# Patient Record
Sex: FEMALE | Race: BLACK OR AFRICAN AMERICAN | Employment: FULL TIME | URBAN - METROPOLITAN AREA
[De-identification: names, ages, dates, MRNs, and addresses within clinical notes are randomized per-mention and may not be internally consistent; named-entity substitution may affect disease eponyms.]

---

## 2017-06-29 ENCOUNTER — TELEPHONE (OUTPATIENT)
Dept: SURGERY | Age: 52
End: 2017-06-29

## 2018-12-14 ENCOUNTER — HOSPITAL ENCOUNTER (OUTPATIENT)
Dept: VASCULAR SURGERY | Age: 53
Discharge: HOME | End: 2018-12-14
Attending: FAMILY MEDICINE
Payer: COMMERCIAL

## 2018-12-14 DIAGNOSIS — M71.22 SYNOVIAL CYST OF LEFT POPLITEAL SPACE: ICD-10-CM

## 2018-12-14 PROCEDURE — 93971 EXTREMITY STUDY: CPT

## 2018-12-14 NOTE — PROCEDURES
[facility]  *** FINAL REPORT ***    Name: Melisa Noguera  MRN: [id number]    Outpatient  : 1965  HIS Order #: [id number]  [address] [facility] Visit #: [id number]  Date: 14 Dec 2018    TYPE OF TEST: Peripheral Venous Testing    REASON FOR TEST  Limb swelling    Left Leg:-  Deep venous thrombosis:           No  Superficial venous thrombosis:    No  Deep venous insufficiency:        No  Superficial venous insufficiency: No      INTERPRETATION/FINDINGS  PROCEDURE:  LEFT LOWER EXTREMITY VENOUS DUPLEX . Evaluation of lower  extremity veins with ultrasound (B-mode imaging, pulsed Doppler, color   Doppler). Includes the common femoral, deep femoral, femoral,  popliteal, posterior tibial, peroneal, and great saphenous veins. Other veins, for example the gastrocnemius and soleal veins, may also  be visualized. FINDINGS: Kerry Sara scale and color flow duplex images of the veins in the  left lower extremity demonstrate normal compressibility, spontaneous  and augmented flow profiles, and absence of filling defects throughout   the deep and superficial veins in the left lower extremity. CONCLUSION:  Left lower extremity venous duplex negative for deep  venous thrombosis or thrombophlebitis. Right common femoral vein is  thrombus free. ADDITIONAL COMMENTS  NOTE: No Baker's cyst noted in left  in left popliteal fossa. I have personally reviewed the data relevant to the interpretation of  this  study. TECHNOLOGIST: Dagoberto Dance, RVT  Signed: 2018 08:44 AM    PHYSICIAN: Ryan Argueta MD  Signed: 2018 09:48 AM

## 2020-10-27 ENCOUNTER — OFFICE VISIT (OUTPATIENT)
Dept: SURGERY | Age: 55
End: 2020-10-27
Payer: COMMERCIAL

## 2020-10-27 VITALS
HEART RATE: 80 BPM | BODY MASS INDEX: 24.91 KG/M2 | HEIGHT: 66 IN | OXYGEN SATURATION: 100 % | SYSTOLIC BLOOD PRESSURE: 129 MMHG | DIASTOLIC BLOOD PRESSURE: 86 MMHG | RESPIRATION RATE: 20 BRPM | TEMPERATURE: 98.5 F | WEIGHT: 155 LBS

## 2020-10-27 DIAGNOSIS — R63.5 WEIGHT GAIN, ABNORMAL: Primary | ICD-10-CM

## 2020-10-27 DIAGNOSIS — Z98.84 GASTRIC BANDING STATUS: ICD-10-CM

## 2020-10-27 PROCEDURE — 99203 OFFICE O/P NEW LOW 30 MIN: CPT | Performed by: SURGERY

## 2020-10-27 RX ORDER — ESTRADIOL 0.5 MG/1
TABLET ORAL DAILY
COMMUNITY

## 2020-10-27 RX ORDER — TOPIRAMATE 50 MG/1
TABLET, FILM COATED ORAL 2 TIMES DAILY
COMMUNITY

## 2020-10-27 RX ORDER — PROGESTERONE 100 MG/1
CAPSULE ORAL
COMMUNITY

## 2020-10-27 NOTE — PROGRESS NOTES
1. Have you been to the ER, urgent care clinic since your last visit? Hospitalized since your last visit? No    2. Have you seen or consulted any other health care providers outside of the [address] [place] [address] since your last visit? Include any pap smears or colon screening.  No

## 2020-10-28 ENCOUNTER — TRANSCRIBE ORDER (OUTPATIENT)
Dept: REGISTRATION | Age: 55
End: 2020-10-28

## 2020-10-28 DIAGNOSIS — Z01.812 PRE-PROCEDURE LAB EXAM: Primary | ICD-10-CM

## 2020-10-28 NOTE — PROGRESS NOTES
Surgery Consult    Subjective:      Nicole Thompson is a 54 y.o. female with a history of morbid obesity, managed through laparoscopic adjustable gastric banding in 2010 with Dr. Armando Diop. She has done well from weight weight loss standpoint, with decrease in body mass index to 24 with good compliance with diet/exercise and adjustments as needed. Last adjustment in 2015. Over the last several months, she notes gradual weight regain with decrease in sense of restriction. She has increased exercise and is compliant with a bariatric diet with no improvement in return to baseline weight. She denies dysphagia, regurgitation, reflux symptoms, nausea, vomiting, abdominal pain. She previously underwent resiting of her band port with improvement in port related pain.     Patient Active Problem List    Diagnosis Date Noted    Weight gain, abnormal 83/81/8951    Complication of gastric band procedure (port site pain) 08/20/2014    Nausea 07/31/2014    Gastric banding status 08/23/2012    HTN (hypertension) 08/23/2012    Migraine headache 08/23/2012    DJD (degenerative joint disease) 08/23/2012     Past Medical History:   Diagnosis Date    Acid reflux disease     GERD (gastroesophageal reflux disease)     Hypertension     Migraine syndrome     Morbid obesity ([address]     Osteoarthritis     knees    Sleep apnea     Thromboembolus ([address] 2009    bilateral PE    Unspecified sleep apnea     no longer has since gastric band and wt loss/but not a new sleep study      Past Surgical History:   Procedure Laterality Date    ABDOMEN SURGERY PROC UNLISTED  3/3/10    Lap adjustable band (REALIZE)    HX DILATION AND CURETTAGE       x4 for miscarriages    HX GI  3/3/10    Lap adjustable gastric band (REALIZE)    HX GI  2010    colonoscopy x2    HX GI  8/28/14     Removal and replacement of subcutaneous port componet by Dr Myriam Cedeno HX HEENT      wisdom teeth removed    HX HERNIA REPAIR  3/3/10    hiatal hernia repair      Social History     Tobacco Use    Smoking status: Never Smoker    Smokeless tobacco: Never Used   Substance Use Topics    Alcohol use: Yes     Comment: occasional      Family History   Problem Relation Age of Onset   Uma Natarajanoth Stroke Mother     Heart Attack Mother     Hypertension Mother     Diabetes Father     Heart Disease Father     Hypertension Father     Diabetes Sister     Heart Disease Sister     Hypertension Sister     Lung Disease Sister     COPD Sister     Hypertension Sister     Hypertension Brother     Anesth Problems Neg Hx       Current Outpatient Medications   Medication Sig    estradioL (Estrace) 0.5 mg tablet Take  by mouth daily.  topiramate (TOPAMAX) 50 mg tablet Take  by mouth two (2) times a day.  progesterone (PROMETRIUM) 100 mg capsule progesterone micronized 100 mg capsule   Take 1 capsule every day by oral route.  rivaroxaban (XARELTO) 10 mg tablet Take 20 mg by mouth daily.  ergocalciferol (VITAMIN D2) 50,000 unit capsule Take 100,000 Units by mouth two (2) times a week.  hydrocortisone (HYCORT) 1 % ointment Apply  to affected area two (2) times a day. use thin layer    furosemide (LASIX) 20 mg tablet Take 20 mg by mouth daily.  triamterene-hydrochlorothiazide (DYAZIDE) 37.5-25 mg per capsule Take 1 Cap by mouth daily.  propranolol (INDERAL) 80 mg tablet Take 40 mg by mouth two (2) times a day.  frovatriptan (FROVA) 2.5 mg tablet Take 2.5 mg by mouth once as needed.  cyclobenzaprine (FLEXERIL) 5 mg tablet Take 5 mg by mouth three (3) times daily as needed.  metaxalone (SKELAXIN) 800 mg tablet Take 800 mg by mouth as needed.  traZODone (DESYREL) 50 mg tablet Take 50 mg by mouth as needed.  mometasone (NASONEX) 50 mcg/actuation nasal spray 2 Sprays by Both Nostrils route as needed. No current facility-administered medications for this visit. Allergies   Allergen Reactions    Latex Hives and Swelling     Blisters. Patient stated it occurred after her surgery 9/2014.  Adhesive Tape-Silicones Other (comments)     swels area and causes bruising       Review of Systems:    A complete review of systems was negative except as noted in the HPI. Objective:        Visit Vitals  /86   Pulse 80   Temp 98.5 °F (36.9 °C)   Resp 20   Ht 5' 6\" (1.676 m)   Wt 155 lb (70.3 kg)   LMP 09/11/2012   SpO2 100%   BMI 25.02 kg/m²       Physical Exam:  GENERAL: alert, cooperative, no distress, appears stated age, EYE: negative, LYMPH NODES: No supraclavicular or cervical adenopathy. THROAT & NECK: normal, LUNG: clear to auscultation bilaterally, HEART: regular rate and rhythm, S1, S2 normal, no murmur. ABDOMEN: Nondistended, soft, normal active bowel sounds. Upper abdominal port without pain with palpation or cellulitis of overlying skin. No hernia. EXTREMITIES:  extremities normal, atraumatic, no cyanosis or edema, SKIN: Normal., NEUROLOGIC: negative    Assessment:     Status post laparoscopic adjustable gastric banding with recent weight regain; no improvement with increase exercise regimen, adherence to bariatric diet. She also notes decreased sense of restriction but denies dysphagia or reflux symptoms. Plan:     1. I recommend proceeding with scheduling for fluoroscopy guided band adjustment. 2.  We will schedule dietitian appointment to review food diary. 3.  Continue exercise regimen.

## 2020-10-28 NOTE — PATIENT INSTRUCTIONS
Abnormal Weight Gain: Care Instructions Your Care Instructions There are two types of weight gainnormal and abnormal. Normal weight gain is usually caused by eating too much or exercising too little. It can also happen as you get older. But abnormal weight gain has other causes. It can be caused by a problem with your thyroid gland, called hypothyroidism. Or it can be caused by a problem with your adrenal glands, called Cushing's syndrome. Or your body could be holding too much fluid because of kidney, liver, or heart problems. In some cases, a medicine you take can cause you to gain weight. You can work with your doctor to find out the cause of your weight gain. You will probably need tests to do this. Follow-up care is a key part of your treatment and safety. Be sure to make and go to all appointments, and call your doctor if you are having problems. It's also a good idea to know your test results and keep a list of the medicines you take. How can you care for yourself at home? · Weigh yourself at the same time every day. It's best to do it first thing in the morning after you empty your bladder. Be sure to always wear the same amount of clothing. · Write down any changes in your weight and the possible causes. Discuss these with your doctor. · Your doctor may want you to change your diet and exercise habits. A good way to lose weight is to reduce calories and increase exercise. · Walking is an easy way to get exercise. Try to walk a little longer every day. You also may want to swim, bike, or do other activities. · Ask your doctor if you should see a dietitian. This is a person who can help you plan meals that work best for your lifestyle. · If your doctor prescribed medicines, take them exactly as prescribed. Call your doctor if you think you are having a problem with your medicine. You will get more details on the specific medicines your doctor prescribes. When should you call for help? Watch closely for changes in your health, and be sure to contact your doctor if: 
  · You do not get better as expected.  
  · You continue to gain weight. Where can you learn more? Go to http://www.[employer].com/ Enter A175 in the search box to learn more about \"Abnormal Weight Gain: Care Instructions. \" Current as of: December 11, 2019               Content Version: 12.6 © 1444-0630 [employer]. Care instructions adapted under license by [employer] (which disclaims liability or warranty for this information). If you have questions about a medical condition or this instruction, always ask your healthcare professional. [address] any warranty or liability for your use of this information.

## 2020-10-31 ENCOUNTER — HOSPITAL ENCOUNTER (OUTPATIENT)
Dept: PREADMISSION TESTING | Age: 55
Discharge: HOME | End: 2020-10-31
Payer: COMMERCIAL

## 2020-10-31 DIAGNOSIS — Z01.812 PRE-PROCEDURE LAB EXAM: ICD-10-CM

## 2020-10-31 PROCEDURE — 87635 SARS-COV-2 COVID-19 AMP PRB: CPT

## 2020-11-01 LAB — SARS-COV-2, COV2NT: NOT DETECTED

## 2020-11-04 ENCOUNTER — APPOINTMENT (OUTPATIENT)
Dept: GENERAL RADIOLOGY | Age: 55
End: 2020-11-04
Attending: SURGERY
Payer: COMMERCIAL

## 2020-11-04 ENCOUNTER — HOSPITAL ENCOUNTER (OUTPATIENT)
Age: 55
Setting detail: OUTPATIENT SURGERY
Discharge: HOME | End: 2020-11-04
Attending: SURGERY | Admitting: SURGERY
Payer: COMMERCIAL

## 2020-11-04 VITALS
HEIGHT: 66 IN | DIASTOLIC BLOOD PRESSURE: 80 MMHG | BODY MASS INDEX: 25.13 KG/M2 | WEIGHT: 156.38 LBS | SYSTOLIC BLOOD PRESSURE: 130 MMHG

## 2020-11-04 PROCEDURE — 43999 UNLISTED PROCEDURE STOMACH: CPT | Performed by: SURGERY

## 2020-11-04 PROCEDURE — S2083 ADJUSTMENT GASTRIC BAND: HCPCS | Performed by: PHYSICIAN ASSISTANT

## 2020-11-04 PROCEDURE — 77002 NEEDLE LOCALIZATION BY XRAY: CPT

## 2020-11-04 PROCEDURE — 92611 MOTION FLUOROSCOPY/SWALLOW: CPT | Performed by: SURGERY

## 2020-11-04 NOTE — DISCHARGE INSTRUCTIONS
Post Lap Band Fill Instructions    Your band is now very tight. Some swelling can occur at the band following a fill. Therefore, you should eat:    Full liquids for 2 days (today & tomorrow)  Soft & mushy foods for 2 days  Resume regular food on the 5th day following your fill. - Thursday  **All meals should fit in a 4 ounce cup.  (1/2 cup container)**    Eating tips:  Put down your fork between bites. Do not talk and eat at the same time. If you must use your fork, use it to push the food around on your plate while chewing what is in your mouth. No drinking 30 minutes prior or one hour following a meal.    Weigh yourself every Monday morning. Call for a fill if you are losing less than 1.5 pounds a week. Call for a fill if you are having increasing hunger between meals. Call for an evaluation if you develop new reflux or inability to tolerate solid foods. Your next regular follow-up appointment is in:  4 to 6 weeks  Please call the office at [phone] to schedule this appointment. If you have any problems before your scheduled appointment, don't wait. Call the office when you are having the problem. They may need to see your before your scheduled appointment.

## 2020-11-04 NOTE — PROGRESS NOTES
PROCEDURAL PROGRESS NOTES FOR LAP BAND FILLS      Patient ID:   Name: Ford Moran   Medical Record Number: [id number]   YOB: 1965         Date of Procedure: 11/4/2020    Pre-Procedure Diagnosis: MORBID OBESITY,WEIGHT GAIN    Post-Procedure Diagnosis:  MORBID OBESITY,WEIGHT GAIN              Height: 5' 6\" (167.6 cm)      Weight: 70.9 kg (156 lb 6 oz)                  Surgeon(s) and Role:  SUKH Fragoso--performing band evaluation/ adjustment with use of fluoroscopy        * Magalys Preciado MD - supervising MD     Procedure: Procedure(s):  ADJUSTMENT\ Evaluation  OF BAND UNDER FLUORO    Findings:   Band in what position:  Good                     . Estimated Blood Loss: 0 ml    Complications: None   Complications:  None       Pt presents today for band adjustment under fluoroscopy . Informed consent was obtained  Last adjustment: pt had a Realize band placed by  10 years ago. No follow up inpast 5 years since she saw Aaron Kyle in 2015 and was adjusted to a total of 9.5 cc in her band,  She saw Dr. Evelia Small last month and c/o wt gain and hunger and was scheduled for band evaluation for today     Patient  Has not  been experiencing nausea, vomiting, dysphagia, and/or reflux. 9.5 cc in band documented   0 cc added to band today    Pt ingested barium   Barium flowed through the band /without evidence of leak or obstruction. There was not  any pouch dilatation or esophageal dysmotility. The band appeared to be in good position. pt s band is in appropriate adjustment with slow transit but no obstruction, given BMI 25 and 9.5 cc in her band pt does not meet criteria for band adjustment,- no adjustment indicated today and pt was agreeable to that     Diet reviewed with pt and counseled to discontinue liquid meals and replace with textured protein foods if she has hunger during the day. Plan:   Patient was instructed to resume her usual  diet as tolerated.  She can follow up with nutrition for diet review   Patient to notify the office if experiences frequent nausea, vomiting, reflux, and/or dysphagia. Patient to follow-up in office in 4-6 weeks.  Patient was discharged in stable and satisfactory condition  [facility]      Signed By: SUKH Blackmon     November 4, 2020 12:52 PM

## 2021-08-12 ENCOUNTER — TELEPHONE (OUTPATIENT)
Dept: SURGERY | Age: 56
End: 2021-08-12

## 2021-08-12 NOTE — TELEPHONE ENCOUNTER
Patient called in wanting a letter for clearance to have laser liposuction treatment. She also wants to know Dr. Kirsty Clark with her band. She wants to know if his plan is for her to keep it or have it removed.    CB: [phone]

## 2021-10-05 ENCOUNTER — OFFICE VISIT (OUTPATIENT)
Dept: SURGERY | Age: 56
End: 2021-10-05
Payer: COMMERCIAL

## 2021-10-05 VITALS
RESPIRATION RATE: 18 BRPM | BODY MASS INDEX: 26.55 KG/M2 | WEIGHT: 165.2 LBS | HEART RATE: 86 BPM | DIASTOLIC BLOOD PRESSURE: 82 MMHG | HEIGHT: 66 IN | SYSTOLIC BLOOD PRESSURE: 130 MMHG | OXYGEN SATURATION: 100 % | TEMPERATURE: 98 F

## 2021-10-05 DIAGNOSIS — R63.5 WEIGHT GAIN, ABNORMAL: Primary | ICD-10-CM

## 2021-10-05 PROCEDURE — 99213 OFFICE O/P EST LOW 20 MIN: CPT | Performed by: SURGERY

## 2021-10-05 NOTE — PROGRESS NOTES
1. Have you been to the ER, urgent care clinic since your last visit? Hospitalized since your last visit? no    2. Have you seen or consulted any other health care providers outside of the [address] [place] [address] since your last visit? Include any pap smears or colon screening.  no

## 2021-10-05 NOTE — LETTER
11/30/2021 11:07 AM    Patient:  Robin Carrasco   YOB: 1965  Date of Visit: 10/5/2021    To whom it may concern:    Laure Goodman underwent laparoscopic adjustable gastric banding (realize band) in 2010 with satisfactory weight loss results; she underwent port reciting in 2014 which improved port related abdominal wall pain. She has brought it to our attention she is interested in a form of liposuction. As her band port and tubing are in the subcutaneous layer of the abdominal wall, the effect of liposuction on these portions of the device is unknown. Therefore, I would recommend caution in the use of such devices in proximity to her upper abdominal port if the decision is made to proceed with liposuction. Please feel free to contact me if additional information would be helpful.     Lavonne Frankel, MD, FACS

## 2021-11-08 ENCOUNTER — TELEPHONE (OUTPATIENT)
Dept: SURGERY | Age: 56
End: 2021-11-08

## 2021-11-08 NOTE — TELEPHONE ENCOUNTER
I called the patient and she said she came in and saw Dr Juan Carranza on 10/5/21 and he was going to give her a letter stating she can have laser liposuction. Pt is asking for the letter. I told her it has not been done yet but I will forward this to Dr Juan Carranza so he can provide her with a letter. Pt in agreement.

## 2021-11-08 NOTE — TELEPHONE ENCOUNTER
I called the patient and I received her voice mail, Mail box is full so unable to leave a message. We are unable to clear her for Liposuction if it is abdominal as she has a lap gastric band and port. We can provide her with a letter just stating she has a lap gastric band. Will wait for patient to call us back.

## 2021-11-08 NOTE — TELEPHONE ENCOUNTER
Patient called stating that she came in for her follow up visit to get the a letter for clearance (to have laser liposuction treatment) but did not receive it. Patient stated she was told the letter would be mailed to her. She would to come in office to pick this note up asap as this has been on going for 3 months. Please advise.

## 2021-11-12 NOTE — TELEPHONE ENCOUNTER
Patient called in wanting to know the status of her letter. Patient states it has been a month.    CB: [phone]

## 2021-11-30 ENCOUNTER — DOCUMENTATION ONLY (OUTPATIENT)
Dept: SURGERY | Age: 56
End: 2021-11-30

## 2021-11-30 NOTE — PROGRESS NOTES
Subjective:      Ophelia Heimlich is a 64 y.o. female presents for reevaluation of weight gain following laparoscopic possible gastric banding approximately 11 years ago with Dr. Tila Larson. Since last visit in 2020, she underwent fluoroscopy, possible band adjustment, but no fluid was added as patient's BMI was 25, with slow transit of barium through the pouch and band channel. She did not make appointment with dietitian as ordered. She notes some weight gain since last year. She is interested in liposuction of abdominal wall and requests a letter clearing her for this procedure. She denies nausea, vomiting, regurgitation, reflux symptoms, or dysphagia. No port pain. Objective:     Visit Vitals  /82   Pulse 86   Temp 98 °F (36.7 °C) (Oral)   Resp 18   Ht 5' 6\" (1.676 m)   Wt 165 lb 3.2 oz (74.9 kg)   LMP 09/11/2012   SpO2 100%   BMI 26.66 kg/m²       General:  alert, cooperative, no distress, appears stated age, mildly obese   Abdomen:  Deferred   Incision:   Deferred     Assessment:     Weight regain following laparoscopic adjustable gastric banding; no dietitian follow-up. Weight gain is marginal, no reflux symptoms. I counseled her that liposuction could alter her port or tubing within the abdominal wall or damage it. I will generate a letter to this effect. She will have to decide with her plastic surgeon whether to proceed. Plan:     1. Continue current medications. 2.  Bariatric diet as tolerated. Exercise as tolerated. 3.  Follow-up in 6 months sooner if clinical issues arise.

## 2021-11-30 NOTE — PATIENT INSTRUCTIONS
Abnormal Weight Gain: Care Instructions  Your Care Instructions     There are two types of weight gainnormal and abnormal. Normal weight gain is usually caused by eating too much or exercising too little. It can also happen as you get older. But abnormal weight gain has other causes. It can be caused by a problem with your thyroid gland, called hypothyroidism. Or it can be caused by a problem with your adrenal glands, called Cushing's syndrome. Or your body could be holding too much fluid because of kidney, liver, or heart problems. In some cases, a medicine you take can cause you to gain weight. You can work with your doctor to find out the cause of your weight gain. You will probably need tests to do this. Follow-up care is a key part of your treatment and safety. Be sure to make and go to all appointments, and call your doctor if you are having problems. It's also a good idea to know your test results and keep a list of the medicines you take. How can you care for yourself at home? · Weigh yourself at the same time every day. It's best to do it first thing in the morning after you empty your bladder. Be sure to always wear the same amount of clothing. · Write down any changes in your weight and the possible causes. Discuss these with your doctor. · Your doctor may want you to change your diet and exercise habits. A good way to lose weight is to reduce calories and increase exercise. · Walking is an easy way to get exercise. Try to walk a little longer every day. You also may want to swim, bike, or do other activities. · Ask your doctor if you should see a dietitian. This is a person who can help you plan meals that work best for your lifestyle. · If your doctor prescribed medicines, take them exactly as prescribed. Call your doctor if you think you are having a problem with your medicine. You will get more details on the specific medicines your doctor prescribes.   When should you call for help?  Watch closely for changes in your health, and be sure to contact your doctor if:    · You do not get better as expected.     · You continue to gain weight. Where can you learn more? Go to http://www.[employer].com/  Enter A175 in the search box to learn more about \"Abnormal Weight Gain: Care Instructions. \"  Current as of: March 17, 2021               Content Version: 13.0  © 6976-7667 [employer], [employer]. Care instructions adapted under license by [employer] (which disclaims liability or warranty for this information). If you have questions about a medical condition or this instruction, always ask your healthcare professional. [address] any warranty or liability for your use of this information.

## 2023-01-25 ENCOUNTER — TRANSCRIBE ORDER (OUTPATIENT)
Dept: SCHEDULING | Age: 58
End: 2023-01-25

## 2023-01-25 DIAGNOSIS — R22.41 LOCALIZED SWELLING, MASS AND LUMP, LOWER LIMB, RIGHT: Primary | ICD-10-CM

## 2023-01-28 ENCOUNTER — HOSPITAL ENCOUNTER (OUTPATIENT)
Dept: VASCULAR SURGERY | Age: 58
Discharge: HOME | End: 2023-01-28
Attending: FAMILY MEDICINE
Payer: COMMERCIAL

## 2023-01-28 DIAGNOSIS — R22.41 LOCALIZED SWELLING, MASS AND LUMP, LOWER LIMB, RIGHT: ICD-10-CM

## 2023-01-28 PROCEDURE — 93971 EXTREMITY STUDY: CPT

## 2023-02-03 ENCOUNTER — OFFICE VISIT (OUTPATIENT)
Dept: CARDIOLOGY CLINIC | Age: 58
End: 2023-02-03
Payer: COMMERCIAL

## 2023-02-03 ENCOUNTER — TELEPHONE (OUTPATIENT)
Dept: CARDIOLOGY CLINIC | Age: 58
End: 2023-02-03

## 2023-02-03 VITALS
RESPIRATION RATE: 18 BRPM | HEART RATE: 65 BPM | DIASTOLIC BLOOD PRESSURE: 84 MMHG | SYSTOLIC BLOOD PRESSURE: 134 MMHG | WEIGHT: 189 LBS | BODY MASS INDEX: 30.37 KG/M2 | OXYGEN SATURATION: 100 % | HEIGHT: 66 IN

## 2023-02-03 DIAGNOSIS — R07.9 CHEST PAIN, UNSPECIFIED TYPE: ICD-10-CM

## 2023-02-03 DIAGNOSIS — R94.31 ABNORMAL EKG: ICD-10-CM

## 2023-02-03 DIAGNOSIS — I10 PRIMARY HYPERTENSION: Primary | ICD-10-CM

## 2023-02-03 RX ORDER — CHLORHEXIDINE GLUCONATE 1.2 MG/ML
RINSE ORAL
COMMUNITY

## 2023-02-03 RX ORDER — RIZATRIPTAN BENZOATE 10 MG/1
TABLET, ORALLY DISINTEGRATING ORAL
COMMUNITY

## 2023-02-03 RX ORDER — TERBINAFINE HYDROCHLORIDE 250 MG/1
TABLET ORAL
COMMUNITY

## 2023-02-03 RX ORDER — ESTRADIOL/NORETHINDRONE ACETATE TRANSDERMAL SYSTEM .05; .14 MG/D; MG/D
PATCH, EXTENDED RELEASE TRANSDERMAL
COMMUNITY
Start: 2022-11-28

## 2023-02-03 RX ORDER — SUVOREXANT 10 MG/1
TABLET, FILM COATED ORAL
COMMUNITY
Start: 2022-11-03

## 2023-02-03 RX ORDER — IMIPRAMINE HYDROCHLORIDE 25 MG/1
TABLET, FILM COATED ORAL
COMMUNITY
Start: 2023-01-18

## 2023-02-03 RX ORDER — AMOXICILLIN AND CLAVULANATE POTASSIUM 875; 125 MG/1; MG/1
TABLET, FILM COATED ORAL
COMMUNITY
End: 2023-02-03

## 2023-02-03 RX ORDER — OXYCODONE AND ACETAMINOPHEN 5; 325 MG/1; MG/1
TABLET ORAL
COMMUNITY

## 2023-02-03 NOTE — PROGRESS NOTES
Hudson Ritchie MD, MS, [address] [facility]            HISTORY OF PRESENT ILLNESS:    Shorty Solorio is a 62 y.o. female referred for chest pain. Per Doc. Rachelf - typical chest pain with history of Pulmonary embolus and mild global hypokinesis on stress nuclear scan 12/22/22. BP controlled today. Gets episodes - chest pressure, on and off - unprovoked - paralyzes her in the position she is int. Sharp pain. Releases itself. Scary, painful. Does not know how it's happening. Left sided. Not everyday, maybe not every week. Wasn't paying too much attention to it. Not recently. Can be pressure. Embarrassing, has been in the middle of presentations, has to excuse herself., has to pull over when driving. She cannot move. Palpitations on and off. No visual changes. ++ FH - mother stroke. Sister stroke, blood clots. Both has CAD< PCI. Niece 27, CAD stents in her arteries. NO tobacco.      EKG reviewed -Unusual p wave origin - short [place] interval.      Reviewed [facility] records - baseline EKG there also abnormal - unusual p wave axis/origin. Short [place] interval.    One day exercise nuc stress test -  exercised 5 min  BPM 88% MPHR 7 METS. No chest pain - small apical defect low risk. No ischemia noted    Unusual presentation - ? Musculoskeletal?     Her ectopic atrial rhythm is interesting - discussed 30 day event monitor to assess for atrial tachy or any arrhythmia associated with her abnormal EKG. She does have a significant FH CAD - will get echo + CCS in addition to 30 day event monitor and go from there. Repeat EKG when she comes back.               Total time spent >35 min, reviewing my prior notes, referring physician notes, other subspecialty and primary care notes, all available lab values, all available cardiac testing, all available radiology imaging, vital signs, weights, medications, potential medication interactions, family history, preparing my notes, updating diagnoses, correcting chart errors from other providers, documenting the above, discussing findings/results/testing methodologies/plan with patient, ordering tests/lab, sending prescriptions. SUMMARY:   Problem List  Date Reviewed: 2/3/2023            Codes Class Noted    Chest pain ICD-10-CM: R07.9  ICD-9-CM: 786.50  2/3/2023        Abnormal EKG ICD-10-CM: R94.31  ICD-9-CM: 794.31  2/3/2023        Weight gain, abnormal ICD-10-CM: R63.5  ICD-9-CM: 783.1  1/04/2563        Complication of gastric band procedure (port site pain) ICD-10-CM: K95.09  ICD-9-CM: 539.09  8/20/2014        Nausea ICD-10-CM: R11.0  ICD-9-CM: 787.02  7/31/2014        Gastric banding status ICD-10-CM: Z98.84  ICD-9-CM: V45.86  8/23/2012        HTN (hypertension) ICD-10-CM: I10  ICD-9-CM: 401.9  8/23/2012        Migraine headache ICD-10-CM: G43.909  ICD-9-CM: 346.90  8/23/2012        DJD (degenerative joint disease) ICD-10-CM: M19.90  ICD-9-CM: 715.90  8/23/2012           Current Outpatient Medications on File Prior to Visit   Medication Sig    estradiol-norethindrone (CombiPatch) 0.05-0.14 mg/24 hr CombiPatch 0.05 mg-0.14 mg/24 hr transdermal    imipramine (TOFRANIL) 25 mg tablet     oxyCODONE-acetaminophen (PERCOCET) 5-325 mg per tablet oxycodone-acetaminophen 5 mg-325 mg tablet    chlorhexidine (PERIDEX) 0.12 % solution chlorhexidine gluconate 0.12 % mouthwash    Belsomra 10 mg tablet TAKE 1 TABLET BY MOUTH EVERY DAY AT BEDTIME    rizatriptan (MAXALT-MLT) 10 mg disintegrating tablet TAKE 1 TABLET BY MOUTH FOR MIGRAINE AND MAY REPEAT DOSE WITHIN 2-24 HOURS FOR MAX 2 TABLETS IN 24 HOURS    terbinafine HCL (LAMISIL) 250 mg tablet terbinafine HCl 250 mg tablet    topiramate (TOPAMAX) 50 mg tablet Take  by mouth two (2) times a day. progesterone (PROMETRIUM) 100 mg capsule progesterone micronized 100 mg capsule   Take 1 capsule every day by oral route. hydrocortisone (HYCORT) 1 % ointment Apply  to affected area two (2) times a day.  use thin layer    furosemide (LASIX) 20 mg tablet Take 20 mg by mouth daily. rivaroxaban (XARELTO) 10 mg tablet Take 20 mg by mouth daily. propranolol (INDERAL) 80 mg tablet Take 40 mg by mouth two (2) times a day. ergocalciferol (ERGOCALCIFEROL) 1,250 mcg (50,000 unit) capsule Take 100,000 Units by mouth two (2) times a week. metaxalone (SKELAXIN) 800 mg tablet Take 800 mg by mouth as needed. traZODone (DESYREL) 50 mg tablet Take 50 mg by mouth as needed. mometasone (NASONEX) 50 mcg/actuation nasal spray 2 Sprays by Both Nostrils route as needed. No current facility-administered medications on file prior to visit. CARDIOLOGY STUDIES TO DATE:  No results found for this visit on 02/03/23.                 CARDIAC ROS:   positive for chest pain    Past Medical History:   Diagnosis Date    Acid reflux disease     GERD (gastroesophageal reflux disease)     Hypertension     Migraine syndrome     Morbid obesity ([facility] [address]     Osteoarthritis     knees    Sleep apnea     Thromboembolus ([facility] [place] [address] 2009    bilateral PE    Unspecified sleep apnea     no longer has since gastric band and wt loss/but not a new sleep study       Family History   Problem Relation Age of Onset    Stroke Mother     Heart Attack Mother     Hypertension Mother     Diabetes Father     Heart Disease Father     Hypertension Father     Diabetes Sister     Heart Disease Sister     Hypertension Sister     Lung Disease Sister     COPD Sister     Hypertension Sister     Hypertension Brother     Anesth Problems Neg Hx        Social History     Socioeconomic History    Marital status:      Spouse name: Not on file    Number of children: Not on file    Years of education: Not on file    Highest education level: Not on file   Occupational History    Not on file   Tobacco Use    Smoking status: Never    Smokeless tobacco: Never   Substance and Sexual Activity    Alcohol use: Yes     Comment: occasional    Drug use: No    Sexual activity: Not on file   Other Topics Concern    Not on file   Social History Narrative    Not on file     Social Determinants of Health     Financial Resource Strain: Not on file   Food Insecurity: Not on file   Transportation Needs: Not on file   Physical Activity: Not on file   Stress: Not on file   Social Connections: Not on file   Intimate Partner Violence: Not on file   Housing Stability: Not on file        GENERAL ROS:  A comprehensive review of systems was negative except for that written in the HPI.     Visit Vitals  /84 (BP 1 Location: Right upper arm, BP Patient Position: Sitting, BP Cuff Size: Large adult)   Pulse 65   Resp 18   Ht 5' 6\" (1.676 m)   Wt 85.7 kg (189 lb)   LMP 09/11/2012   SpO2 100%   BMI 30.51 kg/m²     Vitals:    02/03/23 1313   BP: 134/84   Pulse: 65   Resp: 18   SpO2: 100%   Weight: 85.7 kg (189 lb)   Height: 5' 6\" (1.676 m)        Wt Readings from Last 3 Encounters:   02/03/23 85.7 kg (189 lb)   10/05/21 74.9 kg (165 lb 3.2 oz)   11/04/20 70.9 kg (156 lb 6 oz)            BP Readings from Last 3 Encounters:   02/03/23 134/84   10/05/21 130/82   11/04/20 130/80       PHYSICAL EXAM  General appearance: alert, cooperative, no distress, appears stated age  Neck: supple, symmetrical, trachea midline, no adenopathy, thyroid: not enlarged, symmetric, no tenderness/mass/nodules, no carotid bruit, and no JVD  Lungs: clear to auscultation bilaterally  Heart: regular rate and rhythm, S1, S2 normal, no murmur, click, rub or gallop  Extremities: extremities normal, atraumatic, no cyanosis or edema    Lab Results   Component Value Date/Time    Cholesterol, total 180 05/12/2010 04:41 AM    HDL Cholesterol 30 05/12/2010 04:41 AM    LDL, calculated 136.4 (H) 05/12/2010 04:41 AM    Triglyceride 68 05/12/2010 04:41 AM    CHOL/HDL Ratio 6.0 (H) 05/12/2010 04:41 AM       Lab Results   Component Value Date/Time    WBC 7.9 05/12/2010 04:41 AM    Hemoglobin (POC) 14.6 05/11/2010 09:08 PM    HGB 12.8 05/12/2010 04:41 AM    Hematocrit (POC) 43 05/11/2010 09:08 PM    HCT 39.0 05/12/2010 04:41 AM    PLATELET 974 41/89/1164 04:41 AM    MCV 79.1 (L) 05/12/2010 04:41 AM        Lab Results   Component Value Date/Time    Cholesterol, total 180 05/12/2010 04:41 AM    HDL Cholesterol 30 05/12/2010 04:41 AM    LDL, calculated 136.4 (H) 05/12/2010 04:41 AM    Triglyceride 68 05/12/2010 04:41 AM    CHOL/HDL Ratio 6.0 (H) 05/12/2010 04:41 AM        ASSESSMENT  Diagnoses and all orders for this visit:    1. Primary hypertension  -     AMB POC EKG ROUTINE W/ 12 LEADS, INTER & REP  -     CT HEART W/O CONT WITH CALCIUM; Future    2. Chest pain, unspecified type  -     ECHO ADULT COMPLETE; Future  -     CT HEART W/O CONT WITH CALCIUM; Future    3. Abnormal EKG       Encounter Diagnoses   Name Primary? Primary hypertension Yes    Chest pain, unspecified type     Abnormal EKG      Orders Placed This Encounter    CT HEART W/O CONT WITH CALCIUM    AMB POC EKG ROUTINE W/ 12 LEADS, INTER & REP    estradiol-norethindrone (CombiPatch) 0.05-0.14 mg/24 hr    imipramine (TOFRANIL) 25 mg tablet    oxyCODONE-acetaminophen (PERCOCET) 5-325 mg per tablet    chlorhexidine (PERIDEX) 0.12 % solution    DISCONTD: amoxicillin-clavulanate (AUGMENTIN) 875-125 mg per tablet    Belsomra 10 mg tablet    rizatriptan (MAXALT-MLT) 10 mg disintegrating tablet    terbinafine HCL (LAMISIL) 250 mg tablet       Plan      Follow-up and Dispositions    Return in about 2 weeks (around 2/17/2023) for with echo same day. Nida Carranza MD  2/3/2023        [address]   [address]  [facility], [address] [place] [address] Avenue  [phone] (F)    [address]  [address]  [phone] (P)  [phone] (F)    ATTENTION:   This medical record was transcribed using an electronic medical records/speech recognition system. Although proofread, it may and can contain electronic, spelling and other errors.   Corrections may be executed at a later time. Please feel free to contact us for any clarifications as needed.

## 2023-02-03 NOTE — TELEPHONE ENCOUNTER
Enrolled with [employer] - Ordered and being shipped to patient's home address on file. ETA within 5-7 business days. event  Received:  Today  CORNELIO Clarke  30 day event monitor for palps per Dr. Gilberto Nascimento

## 2023-03-03 ENCOUNTER — HOSPITAL ENCOUNTER (OUTPATIENT)
Dept: CT IMAGING | Age: 58
End: 2023-03-03
Attending: INTERNAL MEDICINE
Payer: SELF-PAY

## 2023-03-03 DIAGNOSIS — I10 PRIMARY HYPERTENSION: ICD-10-CM

## 2023-03-03 DIAGNOSIS — R07.9 CHEST PAIN, UNSPECIFIED TYPE: ICD-10-CM

## 2023-03-03 PROCEDURE — 75571 CT HRT W/O DYE W/CA TEST: CPT

## 2023-03-13 ENCOUNTER — TELEPHONE (OUTPATIENT)
Dept: CARDIOLOGY CLINIC | Age: 58
End: 2023-03-13

## 2023-03-17 ENCOUNTER — TELEPHONE (OUTPATIENT)
Dept: CARDIOLOGY CLINIC | Age: 58
End: 2023-03-17

## 2023-03-17 NOTE — TELEPHONE ENCOUNTER
Spoke to patient after ID x2- coveyed Dr. Martin Clear result note below. MD Lauri Forde, BERNICEN  Good news, your coronary calcium score was ZERO, which is very low risk.

## 2023-06-26 ENCOUNTER — OFFICE VISIT (OUTPATIENT)
Age: 58
End: 2023-06-26
Payer: COMMERCIAL

## 2023-06-26 VITALS
OXYGEN SATURATION: 98 % | SYSTOLIC BLOOD PRESSURE: 120 MMHG | DIASTOLIC BLOOD PRESSURE: 70 MMHG | HEART RATE: 81 BPM | HEIGHT: 66 IN | BODY MASS INDEX: 29.09 KG/M2 | WEIGHT: 181 LBS

## 2023-06-26 DIAGNOSIS — R94.31 ABNORMAL EKG: ICD-10-CM

## 2023-06-26 DIAGNOSIS — I10 ESSENTIAL (PRIMARY) HYPERTENSION: Primary | ICD-10-CM

## 2023-06-26 DIAGNOSIS — I26.99 OTHER PULMONARY EMBOLISM WITHOUT ACUTE COR PULMONALE, UNSPECIFIED CHRONICITY (HCC): ICD-10-CM

## 2023-06-26 DIAGNOSIS — I42.9 CARDIOMYOPATHY (HCC): Primary | ICD-10-CM

## 2023-06-26 DIAGNOSIS — R94.31 ABNORMAL ELECTROCARDIOGRAM (ECG) (EKG): ICD-10-CM

## 2023-06-26 DIAGNOSIS — R07.9 CHEST PAIN, UNSPECIFIED TYPE: ICD-10-CM

## 2023-06-26 DIAGNOSIS — I10 ESSENTIAL (PRIMARY) HYPERTENSION: ICD-10-CM

## 2023-06-26 DIAGNOSIS — R07.9 CHEST PAIN: ICD-10-CM

## 2023-06-26 DIAGNOSIS — R07.9 CHEST PAIN, UNSPECIFIED: ICD-10-CM

## 2023-06-26 PROCEDURE — 3078F DIAST BP <80 MM HG: CPT | Performed by: INTERNAL MEDICINE

## 2023-06-26 PROCEDURE — 99214 OFFICE O/P EST MOD 30 MIN: CPT | Performed by: INTERNAL MEDICINE

## 2023-06-26 PROCEDURE — 93000 ELECTROCARDIOGRAM COMPLETE: CPT | Performed by: INTERNAL MEDICINE

## 2023-06-26 PROCEDURE — 3074F SYST BP LT 130 MM HG: CPT | Performed by: INTERNAL MEDICINE

## 2023-06-26 ASSESSMENT — ENCOUNTER SYMPTOMS
CHEST TIGHTNESS: 0
WHEEZING: 0
SHORTNESS OF BREATH: 0

## 2023-09-05 ENCOUNTER — HOSPITAL ENCOUNTER (OUTPATIENT)
Facility: HOSPITAL | Age: 58
Discharge: HOME | End: 2023-09-08
Attending: INTERNAL MEDICINE
Payer: COMMERCIAL

## 2023-09-05 VITALS — WEIGHT: 183 LBS | BODY MASS INDEX: 29.54 KG/M2

## 2023-09-05 DIAGNOSIS — R07.9 CHEST PAIN, UNSPECIFIED TYPE: ICD-10-CM

## 2023-09-05 DIAGNOSIS — I10 ESSENTIAL (PRIMARY) HYPERTENSION: ICD-10-CM

## 2023-09-05 DIAGNOSIS — R94.31 ABNORMAL EKG: ICD-10-CM

## 2023-09-05 DIAGNOSIS — R07.9 CHEST PAIN, UNSPECIFIED: ICD-10-CM

## 2023-09-05 DIAGNOSIS — R94.31 ABNORMAL ELECTROCARDIOGRAM (ECG) (EKG): ICD-10-CM

## 2023-09-05 DIAGNOSIS — I26.99 OTHER PULMONARY EMBOLISM WITHOUT ACUTE COR PULMONALE, UNSPECIFIED CHRONICITY (HCC): ICD-10-CM

## 2023-09-05 DIAGNOSIS — R07.9 CHEST PAIN: ICD-10-CM

## 2023-09-05 DIAGNOSIS — I42.9 CARDIOMYOPATHY (HCC): ICD-10-CM

## 2023-09-05 LAB
LV EF: 44 %
LVEF MODALITY: NORMAL

## 2023-09-05 PROCEDURE — A9579 GAD-BASE MR CONTRAST NOS,1ML: HCPCS | Performed by: INTERNAL MEDICINE

## 2023-09-05 PROCEDURE — 75561 CARDIAC MRI FOR MORPH W/DYE: CPT | Performed by: INTERNAL MEDICINE

## 2023-09-05 PROCEDURE — 75561 CARDIAC MRI FOR MORPH W/DYE: CPT

## 2023-09-05 PROCEDURE — 6360000004 HC RX CONTRAST MEDICATION: Performed by: INTERNAL MEDICINE

## 2023-09-05 RX ADMIN — GADOTERIDOL 24 ML: 279.3 INJECTION, SOLUTION INTRAVENOUS at 09:48

## 2023-09-26 ENCOUNTER — OFFICE VISIT (OUTPATIENT)
Age: 58
End: 2023-09-26
Payer: COMMERCIAL

## 2023-09-26 VITALS
DIASTOLIC BLOOD PRESSURE: 88 MMHG | HEIGHT: 66 IN | SYSTOLIC BLOOD PRESSURE: 150 MMHG | HEART RATE: 79 BPM | BODY MASS INDEX: 29.25 KG/M2 | OXYGEN SATURATION: 98 % | WEIGHT: 182 LBS

## 2023-09-26 DIAGNOSIS — R94.31 ABNORMAL EKG: ICD-10-CM

## 2023-09-26 DIAGNOSIS — I42.8 NICM (NONISCHEMIC CARDIOMYOPATHY) (HCC): ICD-10-CM

## 2023-09-26 DIAGNOSIS — R94.31 ABNORMAL EKG: Primary | ICD-10-CM

## 2023-09-26 LAB — RHEUMATOID FACT SERPL-ACNC: <10 IU/ML

## 2023-09-26 PROCEDURE — 99214 OFFICE O/P EST MOD 30 MIN: CPT | Performed by: INTERNAL MEDICINE

## 2023-09-26 PROCEDURE — 3079F DIAST BP 80-89 MM HG: CPT | Performed by: INTERNAL MEDICINE

## 2023-09-26 PROCEDURE — 3077F SYST BP >= 140 MM HG: CPT | Performed by: INTERNAL MEDICINE

## 2023-09-26 RX ORDER — METOPROLOL SUCCINATE 25 MG/1
12.5 TABLET, EXTENDED RELEASE ORAL DAILY
Qty: 45 TABLET | Refills: 3 | Status: SHIPPED | OUTPATIENT
Start: 2023-09-26

## 2023-09-26 ASSESSMENT — ENCOUNTER SYMPTOMS
CHEST TIGHTNESS: 0
WHEEZING: 0
SHORTNESS OF BREATH: 0

## 2023-09-28 LAB
ANA SER QL: NEGATIVE
ENA SCL70 AB SER-ACNC: <0.2 AI (ref 0–0.9)

## 2023-11-01 ENCOUNTER — APPOINTMENT (OUTPATIENT)
Facility: HOSPITAL | Age: 58
End: 2023-11-01
Payer: COMMERCIAL

## 2023-11-01 ENCOUNTER — HOSPITAL ENCOUNTER (EMERGENCY)
Facility: HOSPITAL | Age: 58
Discharge: HOME | End: 2023-11-01
Attending: EMERGENCY MEDICINE
Payer: COMMERCIAL

## 2023-11-01 ENCOUNTER — APPOINTMENT (OUTPATIENT)
Facility: HOSPITAL | Age: 58
End: 2023-11-01
Attending: EMERGENCY MEDICINE
Payer: COMMERCIAL

## 2023-11-01 VITALS
HEART RATE: 61 BPM | DIASTOLIC BLOOD PRESSURE: 87 MMHG | HEIGHT: 66 IN | SYSTOLIC BLOOD PRESSURE: 119 MMHG | WEIGHT: 190 LBS | OXYGEN SATURATION: 96 % | RESPIRATION RATE: 15 BRPM | BODY MASS INDEX: 30.53 KG/M2 | TEMPERATURE: 97.6 F

## 2023-11-01 DIAGNOSIS — R56.9 FIRST TIME SEIZURE (HCC): Primary | ICD-10-CM

## 2023-11-01 LAB
ALBUMIN SERPL-MCNC: 3.7 G/DL (ref 3.5–5)
ALBUMIN/GLOB SERPL: 1 (ref 1.1–2.2)
ALP SERPL-CCNC: 42 U/L (ref 45–117)
ALT SERPL-CCNC: 22 U/L (ref 12–78)
ANION GAP SERPL CALC-SCNC: 13 MMOL/L (ref 5–15)
AST SERPL-CCNC: 26 U/L (ref 15–37)
BASOPHILS # BLD: 0.1 K/UL (ref 0–0.1)
BASOPHILS NFR BLD: 1 % (ref 0–1)
BILIRUB SERPL-MCNC: 0.4 MG/DL (ref 0.2–1)
BUN SERPL-MCNC: 19 MG/DL (ref 6–20)
BUN/CREAT SERPL: 16 (ref 12–20)
CALCIUM SERPL-MCNC: 8.8 MG/DL (ref 8.5–10.1)
CHLORIDE SERPL-SCNC: 102 MMOL/L (ref 97–108)
CO2 SERPL-SCNC: 24 MMOL/L (ref 21–32)
CREAT SERPL-MCNC: 1.22 MG/DL (ref 0.55–1.02)
DIFFERENTIAL METHOD BLD: ABNORMAL
EOSINOPHIL # BLD: 0.1 K/UL (ref 0–0.4)
EOSINOPHIL NFR BLD: 2 % (ref 0–7)
ERYTHROCYTE [DISTWIDTH] IN BLOOD BY AUTOMATED COUNT: 14.8 % (ref 11.5–14.5)
GLOBULIN SER CALC-MCNC: 3.8 G/DL (ref 2–4)
GLUCOSE SERPL-MCNC: 103 MG/DL (ref 65–100)
HCT VFR BLD AUTO: 39.3 % (ref 35–47)
HGB BLD-MCNC: 12.3 G/DL (ref 11.5–16)
IMM GRANULOCYTES # BLD AUTO: 0 K/UL (ref 0–0.04)
IMM GRANULOCYTES NFR BLD AUTO: 0 % (ref 0–0.5)
LYMPHOCYTES # BLD: 1.6 K/UL (ref 0.8–3.5)
LYMPHOCYTES NFR BLD: 38 % (ref 12–49)
MCH RBC QN AUTO: 26.6 PG (ref 26–34)
MCHC RBC AUTO-ENTMCNC: 31.3 G/DL (ref 30–36.5)
MCV RBC AUTO: 85.1 FL (ref 80–99)
MONOCYTES # BLD: 0.4 K/UL (ref 0–1)
MONOCYTES NFR BLD: 9 % (ref 5–13)
NEUTS SEG # BLD: 2.1 K/UL (ref 1.8–8)
NEUTS SEG NFR BLD: 50 % (ref 32–75)
NRBC # BLD: 0 K/UL (ref 0–0.01)
NRBC BLD-RTO: 0 PER 100 WBC
PLATELET # BLD AUTO: 177 K/UL (ref 150–400)
PMV BLD AUTO: 10.3 FL (ref 8.9–12.9)
POTASSIUM SERPL-SCNC: 4 MMOL/L (ref 3.5–5.1)
PROT SERPL-MCNC: 7.5 G/DL (ref 6.4–8.2)
RBC # BLD AUTO: 4.62 M/UL (ref 3.8–5.2)
SODIUM SERPL-SCNC: 139 MMOL/L (ref 136–145)
TROPONIN I SERPL HS-MCNC: 11 NG/L (ref 0–51)
WBC # BLD AUTO: 4.3 K/UL (ref 3.6–11)

## 2023-11-01 PROCEDURE — 36415 COLL VENOUS BLD VENIPUNCTURE: CPT

## 2023-11-01 PROCEDURE — 93005 ELECTROCARDIOGRAM TRACING: CPT | Performed by: EMERGENCY MEDICINE

## 2023-11-01 PROCEDURE — 85025 COMPLETE CBC W/AUTO DIFF WBC: CPT

## 2023-11-01 PROCEDURE — 71045 X-RAY EXAM CHEST 1 VIEW: CPT

## 2023-11-01 PROCEDURE — 70450 CT HEAD/BRAIN W/O DYE: CPT

## 2023-11-01 PROCEDURE — 99285 EMERGENCY DEPT VISIT HI MDM: CPT

## 2023-11-01 PROCEDURE — 80053 COMPREHEN METABOLIC PANEL: CPT

## 2023-11-01 PROCEDURE — 84484 ASSAY OF TROPONIN QUANT: CPT

## 2023-11-01 RX ORDER — PHENTERMINE HYDROCHLORIDE 37.5 MG/1
37.5 CAPSULE ORAL EVERY MORNING
COMMUNITY

## 2023-11-01 RX ORDER — SEMAGLUTIDE 0.25 MG/.5ML
0.5 INJECTION, SOLUTION SUBCUTANEOUS
COMMUNITY

## 2023-11-01 RX ORDER — LOSARTAN POTASSIUM 25 MG/1
25 TABLET ORAL DAILY
COMMUNITY
Start: 2023-10-07

## 2023-11-01 RX ORDER — ESTRADIOL/NORETHINDRONE ACETATE TRANSDERMAL SYSTEM .05; .14 MG/D; MG/D
PATCH, EXTENDED RELEASE TRANSDERMAL
COMMUNITY
Start: 2023-10-30

## 2023-11-01 RX ORDER — IMIPRAMINE HCL 25 MG
TABLET ORAL
COMMUNITY
Start: 2023-10-17

## 2023-11-01 ASSESSMENT — ENCOUNTER SYMPTOMS
SHORTNESS OF BREATH: 0
COUGH: 0
ABDOMINAL PAIN: 0
DIARRHEA: 0
VOMITING: 0
ABDOMINAL DISTENTION: 0
NAUSEA: 0
BLOOD IN STOOL: 0
ANAL BLEEDING: 0

## 2023-11-01 ASSESSMENT — PAIN SCALES - GENERAL: PAINLEVEL_OUTOF10: 0

## 2023-11-01 NOTE — ED PROVIDER NOTES
REFERRED TO:  No follow-up provider specified.     DISCHARGE MEDICATIONS:  New Prescriptions    No medications on file         Pito Hess MD (electronically signed)  Emergency Attending Physician

## 2023-11-01 NOTE — ED TRIAGE NOTES
Pt brought in by EMS for witnessed seizure PTA. No hx of the same. Pt denies HA, denies n/v, denies vision changes.   GCS 15

## 2023-11-02 LAB
EKG ATRIAL RATE: 64 BPM
EKG DIAGNOSIS: NORMAL
EKG P AXIS: 45 DEGREES
EKG P-R INTERVAL: 134 MS
EKG Q-T INTERVAL: 450 MS
EKG QRS DURATION: 80 MS
EKG QTC CALCULATION (BAZETT): 464 MS
EKG R AXIS: 3 DEGREES
EKG T AXIS: 22 DEGREES
EKG VENTRICULAR RATE: 64 BPM

## 2023-11-02 NOTE — ED NOTES
Discharge instructions provided. Pt verbalized understanding. Opportunity provided for questions. Pt discharged home.       Daphne Gold RN  11/01/23 2003

## 2024-01-03 ENCOUNTER — OFFICE VISIT (OUTPATIENT)
Age: 59
End: 2024-01-03

## 2024-01-03 ENCOUNTER — OFFICE VISIT (OUTPATIENT)
Age: 59
End: 2024-01-03
Payer: COMMERCIAL

## 2024-01-03 VITALS
HEIGHT: 66 IN | RESPIRATION RATE: 18 BRPM | SYSTOLIC BLOOD PRESSURE: 116 MMHG | BODY MASS INDEX: 31.37 KG/M2 | OXYGEN SATURATION: 100 % | HEART RATE: 56 BPM | DIASTOLIC BLOOD PRESSURE: 78 MMHG | WEIGHT: 195.2 LBS

## 2024-01-03 DIAGNOSIS — R94.31 ABNORMAL EKG: ICD-10-CM

## 2024-01-03 DIAGNOSIS — I49.1 ECTOPIC ATRIAL RHYTHM: Primary | ICD-10-CM

## 2024-01-03 DIAGNOSIS — I42.8 NICM (NONISCHEMIC CARDIOMYOPATHY) (HCC): Primary | ICD-10-CM

## 2024-01-03 DIAGNOSIS — Z86.711 HISTORY OF PULMONARY EMBOLISM: ICD-10-CM

## 2024-01-03 DIAGNOSIS — Z87.898 HISTORY OF SEIZURE: ICD-10-CM

## 2024-01-03 DIAGNOSIS — I10 HYPERTENSION, UNSPECIFIED TYPE: ICD-10-CM

## 2024-01-03 DIAGNOSIS — I42.8 NICM (NONISCHEMIC CARDIOMYOPATHY) (HCC): ICD-10-CM

## 2024-01-03 DIAGNOSIS — I26.99 OTHER PULMONARY EMBOLISM WITHOUT ACUTE COR PULMONALE, UNSPECIFIED CHRONICITY (HCC): ICD-10-CM

## 2024-01-03 PROCEDURE — 99204 OFFICE O/P NEW MOD 45 MIN: CPT | Performed by: INTERNAL MEDICINE

## 2024-01-03 PROCEDURE — 3078F DIAST BP <80 MM HG: CPT | Performed by: INTERNAL MEDICINE

## 2024-01-03 PROCEDURE — 3074F SYST BP LT 130 MM HG: CPT | Performed by: INTERNAL MEDICINE

## 2024-01-03 PROCEDURE — 93000 ELECTROCARDIOGRAM COMPLETE: CPT | Performed by: INTERNAL MEDICINE

## 2024-01-03 RX ORDER — METOPROLOL SUCCINATE 25 MG/1
12.5 TABLET, EXTENDED RELEASE ORAL DAILY
Qty: 45 TABLET | Refills: 3 | Status: SHIPPED | OUTPATIENT
Start: 2024-01-03

## 2024-01-03 RX ORDER — PROMETHAZINE HYDROCHLORIDE 25 MG/1
25 TABLET ORAL EVERY 4 HOURS PRN
COMMUNITY
Start: 2023-12-08

## 2024-01-03 RX ORDER — RIZATRIPTAN BENZOATE 10 MG/1
10 TABLET ORAL DAILY PRN
COMMUNITY
Start: 2023-12-08

## 2024-01-03 ASSESSMENT — ENCOUNTER SYMPTOMS
SHORTNESS OF BREATH: 0
WHEEZING: 0
CHEST TIGHTNESS: 0

## 2024-01-03 ASSESSMENT — PATIENT HEALTH QUESTIONNAIRE - PHQ9
SUM OF ALL RESPONSES TO PHQ9 QUESTIONS 1 & 2: 0
SUM OF ALL RESPONSES TO PHQ QUESTIONS 1-9: 0
1. LITTLE INTEREST OR PLEASURE IN DOING THINGS: 0
2. FEELING DOWN, DEPRESSED OR HOPELESS: 0

## 2024-01-03 NOTE — PROGRESS NOTES
Room #: 1    Chief Complaint   Patient presents with    Cardiomyopathy    Other     Abnormal EKG       Vitals:    01/03/24 1123   BP: 116/78   Site: Left Upper Arm   Position: Sitting   Cuff Size: Large Adult   Pulse: 56   Resp: 18   SpO2: 100%   Weight: 88.5 kg (195 lb 3.2 oz)   Height: 1.676 m (5' 6\")         Chest pain:  NO    Have you been to the ER, urgent care, or hospitalized outside of [facility] since your last visit?   NO    Refills:  NO  
clear.    Impression  No evidence of acute process.      Lab Results   Component Value Date/Time     11/01/2023 05:27 PM    K 4.0 11/01/2023 05:27 PM     11/01/2023 05:27 PM    CO2 24 11/01/2023 05:27 PM    BUN 19 11/01/2023 05:27 PM    CREATININE 1.22 11/01/2023 05:27 PM    GLUCOSE 103 11/01/2023 05:27 PM    CALCIUM 8.8 11/01/2023 05:27 PM    LABGLOM 51 11/01/2023 05:27 PM      No results found for: \"BNP\"  Lab Results   Component Value Date    WBC 4.3 11/01/2023    HGB 12.3 11/01/2023    HCT 39.3 11/01/2023    MCV 85.1 11/01/2023     11/01/2023   Current meds:    Current Outpatient Medications:     rizatriptan (MAXALT) 10 MG tablet, Take 1 tablet by mouth daily as needed for Migraine, Disp: , Rfl:     promethazine (PHENERGAN) 25 MG tablet, Take 1 tablet by mouth every 4 hours as needed for Nausea, Disp: , Rfl:     Semaglutide-Weight Management (WEGOVY) 0.25 MG/0.5ML SOAJ SC injection, Inject 0.25 mg into the skin every 7 days, Disp: , Rfl:     sacubitril-valsartan (ENTRESTO) 24-26 MG per tablet, Take 1 tablet by mouth 2 times daily, Disp: 180 tablet, Rfl: 3    metoprolol succinate (TOPROL XL) 25 MG extended release tablet, Take 0.5 tablets by mouth daily, Disp: 45 tablet, Rfl: 3    ergocalciferol (ERGOCALCIFEROL) 1.25 MG (64283 UT) capsule, Take 2 capsules by mouth Twice a Week, Disp: , Rfl:     hydrocortisone 1 % ointment, Apply topically 2 times daily PRN, Disp: , Rfl:     mometasone (NASONEX) 50 MCG/ACT nasal spray, 2 sprays by Nasal route as needed, Disp: , Rfl:     rivaroxaban (XARELTO) 10 MG TABS tablet, Take 2 tablets by mouth daily, Disp: , Rfl:     topiramate (TOPAMAX) 50 MG tablet, Take 2 tablets by mouth 2 times daily, Disp: , Rfl:     traZODone (DESYREL) 50 MG tablet, Take 1 tablet by mouth as needed, Disp: , Rfl:     Eder Garcia MD    [facility] Cardiology  Call center: P) [phone] (F) [phone]      CC:Sharita Urena MD

## 2024-01-03 NOTE — PROGRESS NOTES
ESHA CRAFT MD, MS, [facility]            HISTORY OF PRESENT ILLNESS:    Hina Faulkner is a 58 y.o. female presents today for had no chief complaint listed for this encounter.     PE on Xarelto.  NICMP (presumed)  Per Marcial. Scharpf - atypical chest pain with history of Pulmonary embolus and mild global hypokinesis on stress nuclear scan 12/22/22.  Reviewed [facility] records - baseline EKG there also abnormal - unusual p wave axis/origin.  Short [place] interval.  CCS ZERO.  30 day event monitor NSR average HR 72 ().  1% PVCs.  Echo 5/15/23 low normal left ventricular systolic function with a visually estimated EF of 45 - 50%. EF by 2D Simpsons Biplane is 41%. Left ventricle size is normal. Normal wall thickness. Normal wall motion. Abnormal diastolic function.  CCS ZERO.  30 day event monitor NSR average HR 72 ().  1% PVCs.  CMR 9/5/23:  1. Normal left ventricular cavity size with mild left ventricular systolic  dysfunction. Mild global hypokinesis. 3-D LVEF 44%.  2. Normal right ventricular size and systolic function. RVEF 59%.   3. No significant valvular disease.  4. On EGE and LGE study, there are patchy areas of myocardial enhancement  including basal and distal mid wall of the septum, basal mid and distal inferior  RV insertion point, lateral subendocardial myocardial enhancement. The  differential diagnosis includes possible prior history of viral myocarditis,  genetic etiology such as Chris's disease or autoimmune myocarditis cannot be  completely ruled out. There is no features of infiltrative sarcoidosis or  cardiac amyloidosis.   5. Normal pericardium with trace posterior pericardial effusion.    Started GDMT entresto step one, toprol 12.5 po daily.  FU 3 months    3.  Abnormal EKG Unusual p wave origin - short [place] interval.  Saw EP Dr. Garcia - no further HARRIS.         Referred for abnormal EKG.  Seen by [facility].  One day exercise nuc stress test -  exercised 5 min  BPM 88% MPHR 7 METS.  No chest

## 2024-03-19 ENCOUNTER — APPOINTMENT (OUTPATIENT)
Facility: HOSPITAL | Age: 59
End: 2024-03-19

## 2024-03-19 ENCOUNTER — HOSPITAL ENCOUNTER (EMERGENCY)
Facility: HOSPITAL | Age: 59
Discharge: HOME | End: 2024-03-19
Attending: STUDENT IN AN ORGANIZED HEALTH CARE EDUCATION/TRAINING PROGRAM

## 2024-03-19 VITALS
OXYGEN SATURATION: 100 % | HEIGHT: 66 IN | HEART RATE: 47 BPM | DIASTOLIC BLOOD PRESSURE: 80 MMHG | TEMPERATURE: 98.2 F | WEIGHT: 199.3 LBS | RESPIRATION RATE: 18 BRPM | BODY MASS INDEX: 32.03 KG/M2 | SYSTOLIC BLOOD PRESSURE: 134 MMHG

## 2024-03-19 DIAGNOSIS — S16.1XXA STRAIN OF NECK MUSCLE, INITIAL ENCOUNTER: ICD-10-CM

## 2024-03-19 DIAGNOSIS — M48.02 NEUROFORAMINAL STENOSIS OF CERVICAL SPINE: ICD-10-CM

## 2024-03-19 DIAGNOSIS — M47.812 OSTEOARTHRITIS OF CERVICAL SPINE, UNSPECIFIED SPINAL OSTEOARTHRITIS COMPLICATION STATUS: ICD-10-CM

## 2024-03-19 DIAGNOSIS — S39.012A STRAIN OF LUMBAR REGION, INITIAL ENCOUNTER: ICD-10-CM

## 2024-03-19 DIAGNOSIS — E07.89 THYROID MASS OF UNCLEAR ETIOLOGY: ICD-10-CM
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
## 2024-03-19 DIAGNOSIS — S09.90XA CLOSED HEAD INJURY, INITIAL ENCOUNTER: ICD-10-CM

## 2024-03-19 PROCEDURE — 72128 CT CHEST SPINE W/O DYE: CPT

## 2024-03-19 PROCEDURE — 72131 CT LUMBAR SPINE W/O DYE: CPT

## 2024-03-19 PROCEDURE — 6370000000 HC RX 637 (ALT 250 FOR IP): Performed by: STUDENT IN AN ORGANIZED HEALTH CARE EDUCATION/TRAINING PROGRAM

## 2024-03-19 PROCEDURE — 70450 CT HEAD/BRAIN W/O DYE: CPT

## 2024-03-19 PROCEDURE — 99284 EMERGENCY DEPT VISIT MOD MDM: CPT

## 2024-03-19 PROCEDURE — 72125 CT NECK SPINE W/O DYE: CPT

## 2024-03-19 RX ORDER — CYCLOBENZAPRINE HCL 10 MG
10 TABLET ORAL ONCE
Status: COMPLETED | OUTPATIENT
Start: 2024-03-19 | End: 2024-03-19

## 2024-03-19 RX ORDER — CYCLOBENZAPRINE HCL 10 MG
10 TABLET ORAL 3 TIMES DAILY PRN
Qty: 21 TABLET | Refills: 0 | Status: SHIPPED | OUTPATIENT
Start: 2024-03-19 | End: 2024-03-29

## 2024-03-19 RX ORDER — LIDOCAINE 4 G/G
2 PATCH TOPICAL
Status: DISCONTINUED | OUTPATIENT
Start: 2024-03-19 | End: 2024-03-19 | Stop reason: HOSPADM

## 2024-03-19 RX ORDER — LIDOCAINE 50 MG/G
1 PATCH TOPICAL DAILY
Qty: 10 PATCH | Refills: 0 | Status: SHIPPED | OUTPATIENT
Start: 2024-03-19 | End: 2024-03-29

## 2024-03-19 RX ORDER — ACETAMINOPHEN 500 MG
1000 TABLET ORAL
Status: COMPLETED | OUTPATIENT
Start: 2024-03-19 | End: 2024-03-19

## 2024-03-19 RX ADMIN — CYCLOBENZAPRINE 10 MG: 10 TABLET, FILM COATED ORAL at 19:14

## 2024-03-19 RX ADMIN — ACETAMINOPHEN 1000 MG: 500 TABLET ORAL at 19:14

## 2024-03-19 ASSESSMENT — ENCOUNTER SYMPTOMS
SHORTNESS OF BREATH: 0
EYE DISCHARGE: 0
RHINORRHEA: 0
DIARRHEA: 0
EYE REDNESS: 0
BACK PAIN: 1
NAUSEA: 0
COUGH: 0
ABDOMINAL PAIN: 0
VOMITING: 0

## 2024-03-19 ASSESSMENT — PAIN SCALES - GENERAL
PAINLEVEL_OUTOF10: 8
PAINLEVEL_OUTOF10: 8

## 2024-03-19 ASSESSMENT — PAIN - FUNCTIONAL ASSESSMENT: PAIN_FUNCTIONAL_ASSESSMENT: 0-10

## 2024-03-19 ASSESSMENT — PAIN DESCRIPTION - LOCATION: LOCATION: NECK

## 2024-03-19 ASSESSMENT — PAIN DESCRIPTION - DESCRIPTORS: DESCRIPTORS: ACHING

## 2024-03-19 NOTE — DISCHARGE INSTRUCTIONS
Take Tylenol every 6-8 hours for pain.  Use muscle relaxant as prescribed.  Perform gentle stretching, use hot showers/baths and heat packs to help with your symptoms.    Your CT scan did show incidental finding of thyroid mass, please follow-up with your primary care doctor for further evaluation with neck/thyroid ultrasound.
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
## 2024-03-19 NOTE — ED NOTES
Verbal shift change report given to FAWAD Xavier (oncoming nurse) by FAWAD Espinoza (offgoing nurse). Report included the following information Nurse Handoff Report, ED Encounter Summary, and ED SBAR.

## 2024-03-20 NOTE — ED NOTES
Pain assessment on discharge was   Condition Stable  Patient discharged to home  Patient education was completed  Education taught to patient  Teaching method used was handout and verbal  Understanding of teaching was good  Patient was discharged ambulatory  Discharged with family  Valuables were given to patient remained in possession of belongings during stay

## 2024-04-01 ENCOUNTER — HOSPITAL ENCOUNTER (OUTPATIENT)
Facility: HOSPITAL | Age: 59
Discharge: HOME | End: 2024-04-04
Payer: COMMERCIAL

## 2024-04-01 DIAGNOSIS — E04.9 NONTOXIC GOITER, UNSPECIFIED: ICD-10-CM

## 2024-04-01 PROCEDURE — 76536 US EXAM OF HEAD AND NECK: CPT

## 2024-04-03 ENCOUNTER — OFFICE VISIT (OUTPATIENT)
Age: 59
End: 2024-04-03
Payer: COMMERCIAL

## 2024-04-03 VITALS
WEIGHT: 196 LBS | OXYGEN SATURATION: 98 % | SYSTOLIC BLOOD PRESSURE: 128 MMHG | HEART RATE: 74 BPM | HEIGHT: 66 IN | DIASTOLIC BLOOD PRESSURE: 74 MMHG | BODY MASS INDEX: 31.5 KG/M2

## 2024-04-03 DIAGNOSIS — I42.8 NICM (NONISCHEMIC CARDIOMYOPATHY) (HCC): Primary | ICD-10-CM

## 2024-04-03 DIAGNOSIS — R94.31 ABNORMAL EKG: ICD-10-CM

## 2024-04-03 DIAGNOSIS — I10 HTN (HYPERTENSION): ICD-10-CM

## 2024-04-03 DIAGNOSIS — I26.99 OTHER PULMONARY EMBOLISM WITHOUT ACUTE COR PULMONALE, UNSPECIFIED CHRONICITY (HCC): ICD-10-CM

## 2024-04-03 PROCEDURE — 3078F DIAST BP <80 MM HG: CPT | Performed by: INTERNAL MEDICINE

## 2024-04-03 PROCEDURE — 99214 OFFICE O/P EST MOD 30 MIN: CPT | Performed by: INTERNAL MEDICINE

## 2024-04-03 PROCEDURE — 3074F SYST BP LT 130 MM HG: CPT | Performed by: INTERNAL MEDICINE

## 2024-04-03 NOTE — PROGRESS NOTES
Chief Complaint   Patient presents with    Other     NICM     Vitals:    04/03/24 0900   BP: 128/74   Site: Left Upper Arm   Position: Sitting   Pulse: 74   SpO2: 98%   Weight: 88.9 kg (196 lb)   Height: 1.676 m (5' 6\")     Chest pain: DENIED     Recent hospital stays: DENIED     Refills: METOPROLOL 25MG   
Per Dr. Mitchell- echo/fup in 6 months.  
is 41%. Left ventricle size is normal. Normal wall thickness. Normal wall motion. Abnormal diastolic function.    Signed by: Janett Mitchell MD on 5/15/2023  4:51 PM        No results found for this or any previous visit.      No results found for this or any previous visit.       Future Appointments   Date Time Provider Department Center   4/3/2024  9:00 AM Janett Mitchell MD [facility]       Janett Mitchell MD    [facility] Cardiology  [facility]    [address], [address]    [place] [address]    Ph: [phone]

## 2024-06-26 ENCOUNTER — TELEPHONE (OUTPATIENT)
Age: 59
End: 2024-06-26

## 2024-06-26 ENCOUNTER — CLINICAL DOCUMENTATION (OUTPATIENT)
Age: 59
End: 2024-06-26
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
## 2024-06-26 NOTE — PROGRESS NOTES
Syncope per Dr Urena  Need echo and holter    If this external monitor is negative, I think she needs loop recorder implant  If LVEF is lower to 35%, she should get dual chamber ICD    Will ask my office to get 14 day holter    05/15/23    TRANSTHORACIC ECHOCARDIOGRAM (TTE) COMPLETE (CONTRAST/BUBBLE/3D PRN) 05/15/2023  4:51 PM (Final)    Interpretation Summary    Left Ventricle: Low normal left ventricular systolic function with a visually estimated EF of 45 - 50%. EF by 2D Simpsons Biplane is 41%. Left ventricle size is normal. Normal wall thickness. Normal wall motion. Abnormal diastolic function.    Signed by: Janett Mitchell MD on 5/15/2023  4:51 PM      Future Appointments   Date Time Provider Department Center   7/17/2024  3:40 PM Janett Mitchell MD [facility]

## 2024-07-12 ENCOUNTER — HOSPITAL ENCOUNTER (OUTPATIENT)
Facility: HOSPITAL | Age: 59
End: 2024-07-12
Payer: COMMERCIAL

## 2024-07-12 DIAGNOSIS — E04.1 NONTOXIC SINGLE THYROID NODULE: ICD-10-CM

## 2024-07-12 PROCEDURE — 10005 FNA BX W/US GDN 1ST LES: CPT

## 2024-07-12 PROCEDURE — 88172 CYTP DX EVAL FNA 1ST EA SITE: CPT

## 2024-07-12 PROCEDURE — 88173 CYTOPATH EVAL FNA REPORT: CPT

## 2024-07-12 PROCEDURE — 2500000003 HC RX 250 WO HCPCS: Performed by: FAMILY MEDICINE

## 2024-07-12 RX ORDER — LIDOCAINE HYDROCHLORIDE 10 MG/ML
10 INJECTION, SOLUTION EPIDURAL; INFILTRATION; INTRACAUDAL; PERINEURAL ONCE
Status: COMPLETED | OUTPATIENT
Start: 2024-07-12 | End: 2024-07-12

## 2024-07-12 RX ADMIN — LIDOCAINE HYDROCHLORIDE 10 ML: 10 INJECTION, SOLUTION EPIDURAL; INFILTRATION; INTRACAUDAL; PERINEURAL at 12:26

## 2024-07-17 ENCOUNTER — OFFICE VISIT (OUTPATIENT)
Age: 59
End: 2024-07-17

## 2024-07-17 VITALS
BODY MASS INDEX: 31.63 KG/M2 | SYSTOLIC BLOOD PRESSURE: 116 MMHG | HEIGHT: 66 IN | DIASTOLIC BLOOD PRESSURE: 82 MMHG | OXYGEN SATURATION: 98 % | HEART RATE: 66 BPM | WEIGHT: 196.8 LBS

## 2024-07-17 DIAGNOSIS — Z86.711 HISTORY OF PULMONARY EMBOLISM: ICD-10-CM

## 2024-07-17 DIAGNOSIS — R55 SYNCOPE AND COLLAPSE: ICD-10-CM

## 2024-07-17 DIAGNOSIS — I42.8 NICM (NONISCHEMIC CARDIOMYOPATHY) (HCC): ICD-10-CM

## 2024-07-17 DIAGNOSIS — I26.99 OTHER PULMONARY EMBOLISM WITHOUT ACUTE COR PULMONALE, UNSPECIFIED CHRONICITY (HCC): Primary | ICD-10-CM

## 2024-07-17 DIAGNOSIS — R55 SYNCOPE, UNSPECIFIED SYNCOPE TYPE: ICD-10-CM
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
## 2024-07-24 ENCOUNTER — PREP FOR PROCEDURE (OUTPATIENT)
Age: 59
End: 2024-07-24

## 2024-07-24 ENCOUNTER — TELEPHONE (OUTPATIENT)
Age: 59
End: 2024-07-24

## 2024-07-24 DIAGNOSIS — R55 SYNCOPE, UNSPECIFIED SYNCOPE TYPE: Primary | ICD-10-CM

## 2024-07-24 DIAGNOSIS — R55 SYNCOPE AND COLLAPSE: ICD-10-CM

## 2024-07-24 NOTE — TELEPHONE ENCOUNTER
Verified patient with two types of identifiers.   Spoke with patient in regards to scheduling ILR. She agrees to set this up at [facility] on Friday 8/2 @ 3:00pm with an arrival time of 1:30. She is aware to have labs prior.  Unable to finish giving instructions as call was dropped, attempted to reach patient again but was unable to get through.   Will try again later.    Spoke with Milli in cath lab to schedule.

## 2024-07-24 NOTE — TELEPHONE ENCOUNTER
Verified patient with two types of identifiers.   Spoke with patient and gave instructions for ILR implant.   Patient verbalized understanding and will call with any other questions.

## 2024-07-24 NOTE — TELEPHONE ENCOUNTER
----- Message from Yamileth Storey LPN sent at 7/18/2024  8:35 AM EDT -----  Regarding: ILR  Dr. Mitchell would like for this patient to get an ILR. Sorry if there is duplicate msgs not sure if she messaged your team or not.

## 2024-07-25 DIAGNOSIS — R55 SYNCOPE, UNSPECIFIED SYNCOPE TYPE: ICD-10-CM

## 2024-07-25 LAB
ANION GAP SERPL CALC-SCNC: 6 MMOL/L (ref 5–15)
BASOPHILS # BLD: 0 K/UL (ref 0–0.1)
BASOPHILS NFR BLD: 1 % (ref 0–1)
BUN SERPL-MCNC: 17 MG/DL (ref 6–20)
BUN/CREAT SERPL: 15 (ref 12–20)
CALCIUM SERPL-MCNC: 8.5 MG/DL (ref 8.5–10.1)
CHLORIDE SERPL-SCNC: 114 MMOL/L (ref 97–108)
CO2 SERPL-SCNC: 24 MMOL/L (ref 21–32)
CREAT SERPL-MCNC: 1.13 MG/DL (ref 0.55–1.02)
DIFFERENTIAL METHOD BLD: ABNORMAL
EOSINOPHIL # BLD: 0.1 K/UL (ref 0–0.4)
EOSINOPHIL NFR BLD: 2 % (ref 0–7)
ERYTHROCYTE [DISTWIDTH] IN BLOOD BY AUTOMATED COUNT: 15.2 % (ref 11.5–14.5)
GLUCOSE SERPL-MCNC: 95 MG/DL (ref 65–100)
HCT VFR BLD AUTO: 37.4 % (ref 35–47)
HGB BLD-MCNC: 11.6 G/DL (ref 11.5–16)
IMM GRANULOCYTES # BLD AUTO: 0 K/UL (ref 0–0.04)
IMM GRANULOCYTES NFR BLD AUTO: 0 % (ref 0–0.5)
LYMPHOCYTES # BLD: 1.6 K/UL (ref 0.8–3.5)
LYMPHOCYTES NFR BLD: 48 % (ref 12–49)
MCH RBC QN AUTO: 27.5 PG (ref 26–34)
MCHC RBC AUTO-ENTMCNC: 31 G/DL (ref 30–36.5)
MCV RBC AUTO: 88.6 FL (ref 80–99)
MONOCYTES # BLD: 0.3 K/UL (ref 0–1)
MONOCYTES NFR BLD: 9 % (ref 5–13)
NEUTS SEG # BLD: 1.3 K/UL (ref 1.8–8)
NEUTS SEG NFR BLD: 40 % (ref 32–75)
NRBC # BLD: 0 K/UL (ref 0–0.01)
NRBC BLD-RTO: 0 PER 100 WBC
PLATELET # BLD AUTO: 159 K/UL (ref 150–400)
PMV BLD AUTO: 11.2 FL (ref 8.9–12.9)
POTASSIUM SERPL-SCNC: 3.8 MMOL/L (ref 3.5–5.1)
RBC # BLD AUTO: 4.22 M/UL (ref 3.8–5.2)
SODIUM SERPL-SCNC: 144 MMOL/L (ref 136–145)
WBC # BLD AUTO: 3.4 K/UL (ref 3.6–11)

## 2024-07-31 RX ORDER — SODIUM CHLORIDE 0.9 % (FLUSH) 0.9 %
5-40 SYRINGE (ML) INJECTION PRN
Status: CANCELLED | OUTPATIENT
Start: 2024-07-31

## 2024-07-31 RX ORDER — SODIUM CHLORIDE 9 MG/ML
INJECTION, SOLUTION INTRAVENOUS PRN
Status: CANCELLED | OUTPATIENT
Start: 2024-07-31

## 2024-07-31 RX ORDER — SODIUM CHLORIDE 0.9 % (FLUSH) 0.9 %
5-40 SYRINGE (ML) INJECTION EVERY 12 HOURS SCHEDULED
Status: CANCELLED | OUTPATIENT
Start: 2024-07-31

## 2024-08-02 ENCOUNTER — HOSPITAL ENCOUNTER (OUTPATIENT)
Facility: HOSPITAL | Age: 59
Setting detail: OUTPATIENT SURGERY
Discharge: HOME | End: 2024-08-02
Attending: INTERNAL MEDICINE | Admitting: INTERNAL MEDICINE
Payer: COMMERCIAL

## 2024-08-02 VITALS — HEIGHT: 66 IN | BODY MASS INDEX: 30.86 KG/M2 | WEIGHT: 192 LBS

## 2024-08-02 DIAGNOSIS — R55 SYNCOPE AND COLLAPSE: ICD-10-CM

## 2024-08-02 LAB — ECHO BSA: 2.01 M2

## 2024-08-02 PROCEDURE — C1764 EVENT RECORDER, CARDIAC: HCPCS | Performed by: INTERNAL MEDICINE

## 2024-08-02 PROCEDURE — 2500000003 HC RX 250 WO HCPCS: Performed by: INTERNAL MEDICINE

## 2024-08-02 PROCEDURE — 33285 INSJ SUBQ CAR RHYTHM MNTR: CPT | Performed by: INTERNAL MEDICINE

## 2024-08-02 PROCEDURE — 2709999900 HC NON-CHARGEABLE SUPPLY: Performed by: INTERNAL MEDICINE

## 2024-08-02 DEVICE — ICM LNQ22 LINQ II USA
Type: IMPLANTABLE DEVICE | Status: FUNCTIONAL
Brand: LINQ II™

## 2024-08-02 RX ORDER — SODIUM CHLORIDE 0.9 % (FLUSH) 0.9 %
5-40 SYRINGE (ML) INJECTION EVERY 12 HOURS SCHEDULED
Status: DISCONTINUED | OUTPATIENT
Start: 2024-08-02 | End: 2024-08-02 | Stop reason: HOSPADM

## 2024-08-02 RX ORDER — CEPHALEXIN 500 MG/1
500 CAPSULE ORAL 3 TIMES DAILY
Qty: 9 CAPSULE | Refills: 0 | Status: SHIPPED | OUTPATIENT
Start: 2024-08-02

## 2024-08-02 RX ORDER — SODIUM CHLORIDE 9 MG/ML
INJECTION, SOLUTION INTRAVENOUS PRN
Status: DISCONTINUED | OUTPATIENT
Start: 2024-08-02 | End: 2024-08-02 | Stop reason: HOSPADM

## 2024-08-02 RX ORDER — SODIUM CHLORIDE 0.9 % (FLUSH) 0.9 %
5-40 SYRINGE (ML) INJECTION PRN
Status: DISCONTINUED | OUTPATIENT
Start: 2024-08-02 | End: 2024-08-02 | Stop reason: HOSPADM

## 2024-08-02 RX ORDER — LIDOCAINE HYDROCHLORIDE 10 MG/ML
INJECTION, SOLUTION INFILTRATION; PERINEURAL PRN
Status: DISCONTINUED | OUTPATIENT
Start: 2024-08-02 | End: 2024-08-02 | Stop reason: HOSPADM

## 2024-08-02 NOTE — PROCEDURES
PROCEDURE NOTE  Date: 8/2/2024   Name: Hina Faulkner  YOB: 1965    Procedures    Cardiac Procedure Note   Patient: Hina Faulkner  MRN: [id number]  SSN: [id number]   YOB: 1965 Age: 59 y.o.  Sex: female    Date of Procedure: 8/2/2024   Pre-procedure Diagnosis: recurrent syncope and myocarditis  Post-procedure Diagnosis: same  Procedure: loop recorder implant  :  Dr. Eder Garcia MD    Assistant(s):  None  Anesthesia: local lidocaine  Estimated Blood Loss: Less than 10 mL   Specimens Removed: None  Findings: the left chest ILR  Complications: None   Implants:  Medtronic loop recorder     Signed by:  Eder Garcia MD  8/2/2024  3:03 PM

## 2024-08-02 NOTE — INTERVAL H&P NOTE
Update History & Physical    The patient's History and Physical of July 17, 2024 was reviewed with the patient and I examined the patient. There was no change. The surgical site was confirmed by the patient and me.     07/10/24    ECHO (TTE) COMPLETE (PRN CONTRAST/BUBBLE/STRAIN/3D) 07/14/2024 11:06 PM (Final)    Interpretation Summary    Left Ventricle: Low normal left ventricular systolic function with a visually estimated EF of 50 - 55%. EF by 2D Simpsons Biplane is 53%. Left ventricle size is normal. Normal wall thickness. Normal wall motion. Normal diastolic function.    Image quality is adequate.    Signed by: Janett Mitchell MD on 7/14/2024 11:06 PM    ILR is indicated for syncope and risk of VT      Plan: The risks, benefits, expected outcome, and alternative to the recommended procedure have been discussed with the patient. Patient understands and wants to proceed with the procedure.     Electronically signed by Eder Garcia MD on 8/2/2024 at 2:14 PM

## 2024-08-02 NOTE — DISCHARGE INSTRUCTIONS
May resume all meds but hold xarelto tonight dose  Before showering, remove dressing on the chest    Future Appointments   Date Time Provider Department Center   10/15/2024  8:40 AM Janett Mitchell MD [facility] AMB   7/24/2025  9:20 AM Eder Garcia MD [facility] AMB

## 2024-08-02 NOTE — PROGRESS NOTES
1:13 PM  Patient arrived. ID and allergies verified verbally with patient. Pt voices understanding of procedure to be performed. Consent obtained. Pt prepped for procedure.     2:50 PM  Procedure finished, left chest site clean, dry, and intact with no bleeding or hematoma noted.     3:02 PM  Discharge instructions and prescriptions reviewed with  patient and family. Opportunity provided for questions. Patient and family verbalized understanding. Signed copy of discharge placed in the front of patient's chart. IV and tele removed.     3:14 PM  Pt discharged via ambulation with spouse. Personal belongings with patient upon discharge.

## 2024-10-01 DIAGNOSIS — I42.8 NICM (NONISCHEMIC CARDIOMYOPATHY) (HCC): ICD-10-CM

## 2024-10-01 RX ORDER — METOPROLOL SUCCINATE 25 MG/1
12.5 TABLET, EXTENDED RELEASE ORAL DAILY
Qty: 45 TABLET | Refills: 0 | Status: SHIPPED | OUTPATIENT
Start: 2024-10-01

## 2024-10-15 ENCOUNTER — OFFICE VISIT (OUTPATIENT)
Age: 59
End: 2024-10-15
Payer: COMMERCIAL

## 2024-10-15 VITALS
DIASTOLIC BLOOD PRESSURE: 76 MMHG | WEIGHT: 198.6 LBS | BODY MASS INDEX: 31.92 KG/M2 | HEART RATE: 60 BPM | OXYGEN SATURATION: 98 % | HEIGHT: 66 IN | SYSTOLIC BLOOD PRESSURE: 116 MMHG

## 2024-10-15 DIAGNOSIS — I42.8 NICM (NONISCHEMIC CARDIOMYOPATHY) (HCC): Primary | ICD-10-CM

## 2024-10-15 DIAGNOSIS — R94.31 ABNORMAL EKG: ICD-10-CM

## 2024-10-15 DIAGNOSIS — I10 HYPERTENSION, UNSPECIFIED TYPE: ICD-10-CM

## 2024-10-15 DIAGNOSIS — R55 SYNCOPE AND COLLAPSE: ICD-10-CM

## 2024-10-15 DIAGNOSIS — I26.99 OTHER PULMONARY EMBOLISM WITHOUT ACUTE COR PULMONALE, UNSPECIFIED CHRONICITY (HCC): ICD-10-CM

## 2024-10-15 DIAGNOSIS — I49.1 ECTOPIC ATRIAL RHYTHM: ICD-10-CM

## 2024-10-15 PROCEDURE — 3078F DIAST BP <80 MM HG: CPT | Performed by: INTERNAL MEDICINE

## 2024-10-15 PROCEDURE — 99214 OFFICE O/P EST MOD 30 MIN: CPT | Performed by: INTERNAL MEDICINE

## 2024-10-15 PROCEDURE — 3074F SYST BP LT 130 MM HG: CPT | Performed by: INTERNAL MEDICINE

## 2024-10-15 NOTE — PROGRESS NOTES
Chief Complaint   Patient presents with    Loss of Consciousness    NICM     Vitals:    10/15/24 0837   BP: 116/76   Site: Left Upper Arm   Position: Sitting   Pulse: 60   SpO2: 98%   Weight: 90.1 kg (198 lb 9.6 oz)   Height: 1.676 m (5' 6\")       Chest pain NO     ER, urgent care, or hospitalized outside of [facility] since your last visit?  NO     Refills NO
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
## 2024-12-31 DIAGNOSIS — I42.8 NICM (NONISCHEMIC CARDIOMYOPATHY) (HCC): ICD-10-CM

## 2025-01-02 RX ORDER — METOPROLOL SUCCINATE 25 MG/1
12.5 TABLET, EXTENDED RELEASE ORAL DAILY
Qty: 45 TABLET | Refills: 3 | Status: SHIPPED | OUTPATIENT
Start: 2025-01-02

## 2025-02-16 DIAGNOSIS — I42.8 NICM (NONISCHEMIC CARDIOMYOPATHY) (HCC): ICD-10-CM

## 2025-02-16 PROCEDURE — 93298 REM INTERROG DEV EVAL SCRMS: CPT | Performed by: INTERNAL MEDICINE

## 2025-02-17 RX ORDER — SACUBITRIL AND VALSARTAN 24; 26 MG/1; MG/1
1 TABLET, FILM COATED ORAL 2 TIMES DAILY
Qty: 180 TABLET | Refills: 2 | Status: SHIPPED | OUTPATIENT
Start: 2025-02-17

## 2025-04-02 PROCEDURE — 93298 REM INTERROG DEV EVAL SCRMS: CPT | Performed by: INTERNAL MEDICINE

## 2025-06-06 ENCOUNTER — TELEPHONE (OUTPATIENT)
Age: 60
End: 2025-06-06

## 2025-06-06 NOTE — TELEPHONE ENCOUNTER
Left message for patient that her appointment on 06/18/2025 with DR. Garcia has been cancelled. Please schedule with next available EP NP at [facility] or [place].    [facility]

## 2025-07-06 PROCEDURE — 93298 REM INTERROG DEV EVAL SCRMS: CPT | Performed by: INTERNAL MEDICINE

## 2025-07-23 NOTE — PROGRESS NOTES
[facility]  Cardiac Electrophysiology Clinic Care Note                  []Initial visit     [x]Established visit     Patient Name: Hina Faulkner - :1965 - MRN:[id number]  Primary Cardiologist: Janett Mitchell MD  Electrophysiologist: Eder Garcia MD     Reason for visit: ILR follow up    HPI:  Ms. Faulkner is a 60 y.o. female who presents for follow up, is s/p Medtronic ILR (DOI 2024).    No significant arrhythmia or bradycardia events noted via ILR thus far.      She reports significant fatigue in the past year since being diagnosed with seizures.  She does note some daytime somnolence.    ECG today shows sinus bradycardia 54 bpm.    NICM.  Echo in 2024 showed LVEF 50-55% with trace MR, trace TR.    Holter in 2024 showed HR  bpm, avg 66 bpm.  PAT noted x 3 events, longest 7 beats, fastest 159 bpm.  PACs 0.25%, PVCs 0.3%.    Cardiac MRI in 2023 showed LVEF 44% with RVEF 59%.  On EGE & LGE study, patchy areas of myocardial enhancement including basal & distal mid wall of the septum, basal mid & distal inferior RV insertion point, lateral subendocardial myocardial enhancement; this is likely consistent with viral myocarditis.    BP controlled.    Anticoagulated with Xarelto due to history of prior PE.  Denies bleeding issues.      Previous:  Medtronic ILR (DOI 2024).    Seizure event reported at 2023 visit, had 2 subsequent normal EEGs.    Ectopic atrial rhythm noted in 2023.    PE very early in life, unclear cause.    DAQUAN.    Sister had renal transplant, another sister with MI, mother with stroke & heart attack.       Assessment and Plan       ICD-10-CM    1. Implantable loop recorder present  Z95.818 CBC with Auto Differential     TSH     T4, Free      2. NICM (nonischemic cardiomyopathy) (HCC)  I42.8 EKG 12 Lead     CBC with Auto Differential     TSH     T4, Free      3. Syncope and collapse  R55 EKG 12 Lead     CBC with Auto Differential

## 2025-07-28 ENCOUNTER — OFFICE VISIT (OUTPATIENT)
Age: 60
End: 2025-07-28
Payer: COMMERCIAL

## 2025-07-28 VITALS
DIASTOLIC BLOOD PRESSURE: 68 MMHG | HEART RATE: 58 BPM | WEIGHT: 191 LBS | SYSTOLIC BLOOD PRESSURE: 108 MMHG | OXYGEN SATURATION: 93 % | BODY MASS INDEX: 30.7 KG/M2 | HEIGHT: 66 IN

## 2025-07-28 DIAGNOSIS — G47.33 OSA ON CPAP: ICD-10-CM

## 2025-07-28 DIAGNOSIS — Z86.711 HISTORY OF PULMONARY EMBOLISM: ICD-10-CM

## 2025-07-28 DIAGNOSIS — Z79.01 ANTICOAGULATED: ICD-10-CM

## 2025-07-28 DIAGNOSIS — I50.22 CHRONIC SYSTOLIC CHF (CONGESTIVE HEART FAILURE) (HCC): ICD-10-CM

## 2025-07-28 DIAGNOSIS — Z95.818 IMPLANTABLE LOOP RECORDER PRESENT: ICD-10-CM

## 2025-07-28 DIAGNOSIS — I42.8 NICM (NONISCHEMIC CARDIOMYOPATHY) (HCC): ICD-10-CM

## 2025-07-28 DIAGNOSIS — Z95.818 IMPLANTABLE LOOP RECORDER PRESENT: Primary | ICD-10-CM

## 2025-07-28 DIAGNOSIS — R55 SYNCOPE AND COLLAPSE: ICD-10-CM

## 2025-07-28 DIAGNOSIS — I10 PRIMARY HYPERTENSION: ICD-10-CM

## 2025-07-28 DIAGNOSIS — R53.83 OTHER FATIGUE: ICD-10-CM

## 2025-07-28 LAB
BASOPHILS # BLD: 0.04 K/UL (ref 0–0.1)
BASOPHILS NFR BLD: 1 % (ref 0–1)
DIFFERENTIAL METHOD BLD: ABNORMAL
EOSINOPHIL # BLD: 0.04 K/UL (ref 0–0.4)
EOSINOPHIL NFR BLD: 1 % (ref 0–7)
ERYTHROCYTE [DISTWIDTH] IN BLOOD BY AUTOMATED COUNT: 14.7 % (ref 11.5–14.5)
HCT VFR BLD AUTO: 35.3 % (ref 35–47)
HGB BLD-MCNC: 11.1 G/DL (ref 11.5–16)
IMM GRANULOCYTES # BLD AUTO: 0 K/UL (ref 0–0.04)
IMM GRANULOCYTES NFR BLD AUTO: 0 % (ref 0–0.5)
LYMPHOCYTES # BLD: 1.54 K/UL (ref 0.8–3.5)
LYMPHOCYTES NFR BLD: 38.1 % (ref 12–49)
MCH RBC QN AUTO: 27.5 PG (ref 26–34)
MCHC RBC AUTO-ENTMCNC: 31.4 G/DL (ref 30–36.5)
MCV RBC AUTO: 87.6 FL (ref 80–99)
MONOCYTES # BLD: 0.35 K/UL (ref 0–1)
MONOCYTES NFR BLD: 8.7 % (ref 5–13)
NEUTS SEG # BLD: 2.07 K/UL (ref 1.8–8)
NEUTS SEG NFR BLD: 51.2 % (ref 32–75)
NRBC # BLD: 0 K/UL (ref 0–0.01)
NRBC BLD-RTO: 0 PER 100 WBC
PLATELET # BLD AUTO: 167 K/UL (ref 150–400)
PMV BLD AUTO: 11.4 FL (ref 8.9–12.9)
RBC # BLD AUTO: 4.03 M/UL (ref 3.8–5.2)
T4 FREE SERPL-MCNC: 0.9 NG/DL (ref 0.8–1.5)
TSH SERPL DL<=0.05 MIU/L-ACNC: 0.4 UIU/ML (ref 0.36–3.74)
WBC # BLD AUTO: 4 K/UL (ref 3.6–11)

## 2025-07-28 PROCEDURE — 99214 OFFICE O/P EST MOD 30 MIN: CPT | Performed by: NURSE PRACTITIONER

## 2025-07-28 PROCEDURE — 3074F SYST BP LT 130 MM HG: CPT | Performed by: NURSE PRACTITIONER

## 2025-07-28 PROCEDURE — 3078F DIAST BP <80 MM HG: CPT | Performed by: NURSE PRACTITIONER

## 2025-07-28 PROCEDURE — 93000 ELECTROCARDIOGRAM COMPLETE: CPT | Performed by: NURSE PRACTITIONER

## 2025-07-28 NOTE — PROGRESS NOTES
Chief Complaint   Patient presents with    Cardiomyopathy     NICM     Vitals:    07/28/25 0937   BP: 108/68   BP Site: Left Upper Arm   Patient Position: Sitting   Pulse: 58   SpO2: 93%   Weight: 86.6 kg (191 lb)   Height: 1.676 m (5' 6\")         Chest pain: DENIED     Recent hospital stays: DENIED     Refills: DENIED

## 2025-07-29 ENCOUNTER — RESULTS FOLLOW-UP (OUTPATIENT)
Age: 60
End: 2025-07-29

## 2025-07-29 NOTE — TELEPHONE ENCOUNTER
Verified patient with two types of identifiers.   Informed patient of lab results per Sally NUÑEZ.  Asked patient to let us know if stopping metoprolol helps with the fatigue.  Patient verbalized understanding and will call with any other questions.        ----- Message from RAOUL Head - NP sent at 7/29/2025  8:43 AM EDT -----  Mildly anemic, but not significantly changed from previous.  TSH & free T4 WNL.  I don't see anything in the labs that would explain her constant fatigue.    No change in treatment plan based on results.

## 2025-08-07 ENCOUNTER — RESULTS FOLLOW-UP (OUTPATIENT)
Age: 60
End: 2025-08-07

## (undated) DEVICE — GOWN,SIRUS,FABRNF,XL,20/CS: Brand: MEDLINE

## (undated) DEVICE — LIQUIBAND RAPID ADHESIVE 36/CS 0.8ML: Brand: MEDLINE

## (undated) DEVICE — TOWEL SURG W17XL27IN STD BLU COT NONFENESTRATED PREWASHED

## (undated) DEVICE — INTENDED FOR TISSUE SEPARATION, AND OTHER PROCEDURES THAT REQUIRE A SHARP SURGICAL BLADE TO PUNCTURE OR CUT.: Brand: BARD-PARKER ®  SAFETY SCALPED